# Patient Record
Sex: FEMALE | Race: WHITE | NOT HISPANIC OR LATINO | Employment: PART TIME | URBAN - NONMETROPOLITAN AREA
[De-identification: names, ages, dates, MRNs, and addresses within clinical notes are randomized per-mention and may not be internally consistent; named-entity substitution may affect disease eponyms.]

---
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
## 2023-11-20 ENCOUNTER — HOSPITAL ENCOUNTER (OUTPATIENT)
Dept: RADIOLOGY | Facility: HOSPITAL | Age: 18
Discharge: HOME | End: 2023-11-20
Attending: NURSE PRACTITIONER
Payer: COMMERCIAL

## 2023-11-20 DIAGNOSIS — O26.842 SIZE OF FETUS INCONSISTENT WITH DATES IN SECOND TRIMESTER: ICD-10-CM

## 2023-11-20 PROCEDURE — 76805 OB US >/= 14 WKS SNGL FETUS: CPT | Mod: 26,,, | Performed by: RADIOLOGY

## 2023-11-20 PROCEDURE — 76805 OB US >/= 14 WKS SNGL FETUS: CPT | Mod: TC

## 2023-11-20 PROCEDURE — 76805 US OB 14+ WEEKS, TRANSABDOM, SINGLE GESTATION: ICD-10-PCS | Mod: 26,,, | Performed by: RADIOLOGY

## 2024-03-21 ENCOUNTER — HOSPITAL ENCOUNTER (OUTPATIENT)
Dept: RADIOLOGY | Facility: HOSPITAL | Age: 19
Discharge: HOME | End: 2024-03-21
Attending: NURSE PRACTITIONER
Payer: COMMERCIAL

## 2024-03-21 PROCEDURE — 76805 OB US >/= 14 WKS SNGL FETUS: CPT | Mod: 26,,, | Performed by: RADIOLOGY

## 2024-03-21 PROCEDURE — 76820 UMBILICAL ARTERY ECHO: CPT | Mod: 26,,, | Performed by: RADIOLOGY

## 2024-03-21 PROCEDURE — 76820 UMBILICAL ARTERY ECHO: CPT | Mod: TC

## 2024-03-21 PROCEDURE — 76805 OB US >/= 14 WKS SNGL FETUS: CPT | Mod: TC

## 2024-04-15 ENCOUNTER — ANESTHESIA EVENT (OUTPATIENT)
Dept: OBSTETRICS AND GYNECOLOGY | Facility: HOSPITAL | Age: 19
End: 2024-04-15
Payer: COMMERCIAL

## 2024-04-15 ENCOUNTER — HOSPITAL ENCOUNTER (INPATIENT)
Facility: HOSPITAL | Age: 19
LOS: 2 days | Discharge: HOME | End: 2024-04-17
Attending: OBSTETRICS & GYNECOLOGY | Admitting: OBSTETRICS & GYNECOLOGY
Payer: COMMERCIAL

## 2024-04-15 ENCOUNTER — ANESTHESIA (OUTPATIENT)
Dept: OBSTETRICS AND GYNECOLOGY | Facility: HOSPITAL | Age: 19
End: 2024-04-15
Payer: COMMERCIAL

## 2024-04-15 DIAGNOSIS — Z67.91 RH NEGATIVE STATUS DURING PREGNANCY IN THIRD TRIMESTER: ICD-10-CM

## 2024-04-15 DIAGNOSIS — O26.893 RH NEGATIVE STATUS DURING PREGNANCY IN THIRD TRIMESTER: ICD-10-CM

## 2024-04-15 DIAGNOSIS — Z34.90 PREGNANCY: Primary | ICD-10-CM

## 2024-04-15 DIAGNOSIS — Z34.93 PREGNANT AND NOT YET DELIVERED IN THIRD TRIMESTER: ICD-10-CM

## 2024-04-15 DIAGNOSIS — Z91.89 VAGINAL DELIVERY WITH POTENTIAL FOR HEMORRHAGE: ICD-10-CM

## 2024-04-15 LAB
ALBUMIN SERPL BCP-MCNC: 2.4 G/DL (ref 3.5–5)
ALBUMIN/GLOB SERPL: 0.7 {RATIO}
ALP SERPL-CCNC: 190 U/L (ref 52–144)
ALT SERPL W P-5'-P-CCNC: 12 U/L (ref 13–56)
ANION GAP SERPL CALCULATED.3IONS-SCNC: 13 MMOL/L (ref 7–16)
AST SERPL W P-5'-P-CCNC: 7 U/L (ref 15–37)
BASOPHILS # BLD AUTO: 0.04 K/UL (ref 0–0.2)
BASOPHILS NFR BLD AUTO: 0.3 % (ref 0–1)
BILIRUB SERPL-MCNC: 0.4 MG/DL (ref ?–1.2)
BILIRUB UR QL STRIP: NEGATIVE
BUN SERPL-MCNC: 8 MG/DL (ref 7–18)
BUN/CREAT SERPL: 17 (ref 6–20)
CALCIUM SERPL-MCNC: 9 MG/DL (ref 8.5–10.1)
CHLORIDE SERPL-SCNC: 108 MMOL/L (ref 98–107)
CLARITY UR: CLEAR
CO2 SERPL-SCNC: 21 MMOL/L (ref 21–32)
COLOR UR: YELLOW
CREAT SERPL-MCNC: 0.47 MG/DL (ref 0.55–1.02)
DIFFERENTIAL METHOD BLD: ABNORMAL
EGFR (NO RACE VARIABLE) (RUSH/TITUS): 140 ML/MIN/1.73M2
EOSINOPHIL # BLD AUTO: 0.11 K/UL (ref 0–0.5)
EOSINOPHIL NFR BLD AUTO: 0.8 % (ref 1–4)
ERYTHROCYTE [DISTWIDTH] IN BLOOD BY AUTOMATED COUNT: 13.9 % (ref 11.5–14.5)
GLOBULIN SER-MCNC: 3.5 G/DL (ref 2–4)
GLUCOSE SERPL-MCNC: 106 MG/DL (ref 74–106)
GLUCOSE UR STRIP-MCNC: NORMAL MG/DL
HBV SURFACE AG SERPL QL IA: NORMAL
HCT VFR BLD AUTO: 32.2 % (ref 38–47)
HGB BLD-MCNC: 10.4 G/DL (ref 12–16)
HIV 1+O+2 AB SERPL QL: NORMAL
HYALINE CASTS #/AREA URNS LPF: ABNORMAL /LPF
IMM GRANULOCYTES # BLD AUTO: 0.11 K/UL (ref 0–0.04)
IMM GRANULOCYTES NFR BLD: 0.8 % (ref 0–0.4)
INDIRECT COOMBS: NORMAL
KETONES UR STRIP-SCNC: NEGATIVE MG/DL
LEUKOCYTE ESTERASE UR QL STRIP: ABNORMAL
LYMPHOCYTES # BLD AUTO: 2.53 K/UL (ref 1–4.8)
LYMPHOCYTES NFR BLD AUTO: 19 % (ref 27–41)
MCH RBC QN AUTO: 28.3 PG (ref 27–31)
MCHC RBC AUTO-ENTMCNC: 32.3 G/DL (ref 32–36)
MCV RBC AUTO: 87.5 FL (ref 80–96)
MONOCYTES # BLD AUTO: 0.96 K/UL (ref 0–0.8)
MONOCYTES NFR BLD AUTO: 7.2 % (ref 2–6)
MPC BLD CALC-MCNC: 11.4 FL (ref 9.4–12.4)
MUCOUS, UA: ABNORMAL /LPF
NEUTROPHILS # BLD AUTO: 9.57 K/UL (ref 1.8–7.7)
NEUTROPHILS NFR BLD AUTO: 71.9 % (ref 53–65)
NITRITE UR QL STRIP: NEGATIVE
NRBC # BLD AUTO: 0 X10E3/UL
NRBC, AUTO (.00): 0 %
PH UR STRIP: 7 PH UNITS
PLATELET # BLD AUTO: 294 K/UL (ref 150–400)
POTASSIUM SERPL-SCNC: 3.8 MMOL/L (ref 3.5–5.1)
PROT SERPL-MCNC: 5.9 G/DL (ref 6.4–8.2)
PROT UR QL STRIP: 20
RBC # BLD AUTO: 3.68 M/UL (ref 4.2–5.4)
RBC # UR STRIP: NEGATIVE /UL
RBC #/AREA URNS HPF: 2 /HPF
RH BLD: NORMAL
RUBV IGG SER-ACNC: NORMAL [IU]/ML
SODIUM SERPL-SCNC: 138 MMOL/L (ref 136–145)
SP GR UR STRIP: 1.03
SPECIMEN OUTDATE: NORMAL
SQUAMOUS #/AREA URNS LPF: ABNORMAL /HPF
SYPHILIS AB INTERPRETATION: NORMAL
UROBILINOGEN UR STRIP-ACNC: 2 MG/DL
WBC # BLD AUTO: 13.32 K/UL (ref 4.5–11)
WBC #/AREA URNS HPF: 3 /HPF

## 2024-04-15 PROCEDURE — 27000980 HC MATTRESS, OVERLAY WAFFLE

## 2024-04-15 PROCEDURE — 72200005 HC VAGINAL DELIVERY LEVEL II

## 2024-04-15 PROCEDURE — 86762 RUBELLA ANTIBODY: CPT | Performed by: OBSTETRICS & GYNECOLOGY

## 2024-04-15 PROCEDURE — 11000001 HC ACUTE MED/SURG PRIVATE ROOM

## 2024-04-15 PROCEDURE — 63600175 PHARM REV CODE 636 W HCPCS: Performed by: OBSTETRICS & GYNECOLOGY

## 2024-04-15 PROCEDURE — 87389 HIV-1 AG W/HIV-1&-2 AB AG IA: CPT | Performed by: OBSTETRICS & GYNECOLOGY

## 2024-04-15 PROCEDURE — 63600175 PHARM REV CODE 636 W HCPCS: Performed by: ANESTHESIOLOGY

## 2024-04-15 PROCEDURE — 25000003 PHARM REV CODE 250: Performed by: NURSE PRACTITIONER

## 2024-04-15 PROCEDURE — 27200960 HC CATHETER, FOLEY (ANY)

## 2024-04-15 PROCEDURE — 81001 URINALYSIS AUTO W/SCOPE: CPT | Performed by: OBSTETRICS & GYNECOLOGY

## 2024-04-15 PROCEDURE — 51702 INSERT TEMP BLADDER CATH: CPT

## 2024-04-15 PROCEDURE — 62326 NJX INTERLAMINAR LMBR/SAC: CPT | Performed by: ANESTHESIOLOGY

## 2024-04-15 PROCEDURE — 3E033VJ INTRODUCTION OF OTHER HORMONE INTO PERIPHERAL VEIN, PERCUTANEOUS APPROACH: ICD-10-PCS | Performed by: NURSE PRACTITIONER

## 2024-04-15 PROCEDURE — 86901 BLOOD TYPING SEROLOGIC RH(D): CPT | Performed by: OBSTETRICS & GYNECOLOGY

## 2024-04-15 PROCEDURE — 0KQM0ZZ REPAIR PERINEUM MUSCLE, OPEN APPROACH: ICD-10-PCS | Performed by: NURSE PRACTITIONER

## 2024-04-15 PROCEDURE — 72100002 HC LABOR CARE, 1ST 8 HOURS

## 2024-04-15 PROCEDURE — 59409 OBSTETRICAL CARE: CPT | Mod: AA,P2,, | Performed by: ANESTHESIOLOGY

## 2024-04-15 PROCEDURE — 25000003 PHARM REV CODE 250: Performed by: OBSTETRICS & GYNECOLOGY

## 2024-04-15 PROCEDURE — 85025 COMPLETE CBC W/AUTO DIFF WBC: CPT | Performed by: OBSTETRICS & GYNECOLOGY

## 2024-04-15 PROCEDURE — 25000003 PHARM REV CODE 250: Performed by: ANESTHESIOLOGY

## 2024-04-15 PROCEDURE — 87340 HEPATITIS B SURFACE AG IA: CPT | Performed by: OBSTETRICS & GYNECOLOGY

## 2024-04-15 PROCEDURE — 10907ZC DRAINAGE OF AMNIOTIC FLUID, THERAPEUTIC FROM PRODUCTS OF CONCEPTION, VIA NATURAL OR ARTIFICIAL OPENING: ICD-10-PCS | Performed by: NURSE PRACTITIONER

## 2024-04-15 PROCEDURE — 72100003 HC LABOR CARE, EA. ADDL. 8 HRS

## 2024-04-15 PROCEDURE — 80053 COMPREHEN METABOLIC PANEL: CPT | Performed by: OBSTETRICS & GYNECOLOGY

## 2024-04-15 PROCEDURE — 86780 TREPONEMA PALLIDUM: CPT | Performed by: OBSTETRICS & GYNECOLOGY

## 2024-04-15 RX ORDER — DIPHENHYDRAMINE HCL 25 MG
25 CAPSULE ORAL EVERY 4 HOURS PRN
Status: DISCONTINUED | OUTPATIENT
Start: 2024-04-15 | End: 2024-04-17 | Stop reason: HOSPADM

## 2024-04-15 RX ORDER — ONDANSETRON 4 MG/1
8 TABLET, ORALLY DISINTEGRATING ORAL EVERY 8 HOURS PRN
Status: DISCONTINUED | OUTPATIENT
Start: 2024-04-15 | End: 2024-04-17 | Stop reason: HOSPADM

## 2024-04-15 RX ORDER — OXYTOCIN/RINGER'S LACTATE 30/500 ML
95 PLASTIC BAG, INJECTION (ML) INTRAVENOUS ONCE
Status: DISCONTINUED | OUTPATIENT
Start: 2024-04-15 | End: 2024-04-17 | Stop reason: HOSPADM

## 2024-04-15 RX ORDER — BUTORPHANOL TARTRATE 2 MG/ML
1 INJECTION INTRAMUSCULAR; INTRAVENOUS
Status: DISCONTINUED | OUTPATIENT
Start: 2024-04-15 | End: 2024-04-15 | Stop reason: SDUPTHER

## 2024-04-15 RX ORDER — SODIUM CHLORIDE, SODIUM LACTATE, POTASSIUM CHLORIDE, CALCIUM CHLORIDE 600; 310; 30; 20 MG/100ML; MG/100ML; MG/100ML; MG/100ML
INJECTION, SOLUTION INTRAVENOUS CONTINUOUS
Status: DISCONTINUED | OUTPATIENT
Start: 2024-04-15 | End: 2024-04-15

## 2024-04-15 RX ORDER — OXYTOCIN/RINGER'S LACTATE 30/500 ML
334 PLASTIC BAG, INJECTION (ML) INTRAVENOUS ONCE AS NEEDED
Status: DISCONTINUED | OUTPATIENT
Start: 2024-04-15 | End: 2024-04-15 | Stop reason: SDUPTHER

## 2024-04-15 RX ORDER — ONDANSETRON HYDROCHLORIDE 2 MG/ML
4 INJECTION, SOLUTION INTRAVENOUS EVERY 6 HOURS PRN
Status: DISCONTINUED | OUTPATIENT
Start: 2024-04-15 | End: 2024-04-17 | Stop reason: HOSPADM

## 2024-04-15 RX ORDER — SIMETHICONE 80 MG
1 TABLET,CHEWABLE ORAL 4 TIMES DAILY PRN
Status: DISCONTINUED | OUTPATIENT
Start: 2024-04-15 | End: 2024-04-15 | Stop reason: SDUPTHER

## 2024-04-15 RX ORDER — OXYTOCIN/RINGER'S LACTATE 30/500 ML
95 PLASTIC BAG, INJECTION (ML) INTRAVENOUS ONCE AS NEEDED
Status: DISCONTINUED | OUTPATIENT
Start: 2024-04-15 | End: 2024-04-17 | Stop reason: HOSPADM

## 2024-04-15 RX ORDER — DIPHENHYDRAMINE HYDROCHLORIDE 50 MG/ML
25 INJECTION INTRAMUSCULAR; INTRAVENOUS EVERY 4 HOURS PRN
Status: DISCONTINUED | OUTPATIENT
Start: 2024-04-15 | End: 2024-04-17 | Stop reason: HOSPADM

## 2024-04-15 RX ORDER — OXYTOCIN 10 [USP'U]/ML
10 INJECTION, SOLUTION INTRAMUSCULAR; INTRAVENOUS ONCE AS NEEDED
Status: DISCONTINUED | OUTPATIENT
Start: 2024-04-15 | End: 2024-04-17 | Stop reason: HOSPADM

## 2024-04-15 RX ORDER — FENTANYL/ROPIVACAINE/NS/PF 2MCG/ML-.2
PLASTIC BAG, INJECTION (ML) INJECTION CONTINUOUS
Status: DISCONTINUED | OUTPATIENT
Start: 2024-04-15 | End: 2024-04-15

## 2024-04-15 RX ORDER — TRANEXAMIC ACID 10 MG/ML
1000 INJECTION, SOLUTION INTRAVENOUS EVERY 30 MIN PRN
Status: DISCONTINUED | OUTPATIENT
Start: 2024-04-15 | End: 2024-04-15 | Stop reason: SDUPTHER

## 2024-04-15 RX ORDER — SODIUM CHLORIDE 0.9 % (FLUSH) 0.9 %
10 SYRINGE (ML) INJECTION 2 TIMES DAILY
Status: DISCONTINUED | OUTPATIENT
Start: 2024-04-15 | End: 2024-04-17 | Stop reason: HOSPADM

## 2024-04-15 RX ORDER — OXYTOCIN/RINGER'S LACTATE 30/500 ML
95 PLASTIC BAG, INJECTION (ML) INTRAVENOUS ONCE AS NEEDED
Status: DISCONTINUED | OUTPATIENT
Start: 2024-04-15 | End: 2024-04-15 | Stop reason: SDUPTHER

## 2024-04-15 RX ORDER — CALCIUM CARBONATE 200(500)MG
500 TABLET,CHEWABLE ORAL 3 TIMES DAILY PRN
Status: DISCONTINUED | OUTPATIENT
Start: 2024-04-15 | End: 2024-04-17 | Stop reason: HOSPADM

## 2024-04-15 RX ORDER — ROPIVACAINE HYDROCHLORIDE 7.5 MG/ML
INJECTION, SOLUTION EPIDURAL; PERINEURAL
Status: COMPLETED | OUTPATIENT
Start: 2024-04-15 | End: 2024-04-15

## 2024-04-15 RX ORDER — OXYTOCIN/RINGER'S LACTATE 30/500 ML
0-32 PLASTIC BAG, INJECTION (ML) INTRAVENOUS CONTINUOUS
Status: DISCONTINUED | OUTPATIENT
Start: 2024-04-15 | End: 2024-04-15 | Stop reason: SDUPTHER

## 2024-04-15 RX ORDER — METHYLERGONOVINE MALEATE 0.2 MG/ML
200 INJECTION INTRAVENOUS ONCE AS NEEDED
Status: DISCONTINUED | OUTPATIENT
Start: 2024-04-15 | End: 2024-04-15 | Stop reason: SDUPTHER

## 2024-04-15 RX ORDER — BUTORPHANOL TARTRATE 2 MG/ML
2 INJECTION INTRAMUSCULAR; INTRAVENOUS
Status: DISCONTINUED | OUTPATIENT
Start: 2024-04-15 | End: 2024-04-15

## 2024-04-15 RX ORDER — METHYLERGONOVINE MALEATE 0.2 MG/ML
200 INJECTION INTRAVENOUS ONCE AS NEEDED
Status: DISCONTINUED | OUTPATIENT
Start: 2024-04-15 | End: 2024-04-17 | Stop reason: HOSPADM

## 2024-04-15 RX ORDER — ONDANSETRON 4 MG/1
8 TABLET, ORALLY DISINTEGRATING ORAL EVERY 8 HOURS PRN
Status: DISCONTINUED | OUTPATIENT
Start: 2024-04-15 | End: 2024-04-15 | Stop reason: SDUPTHER

## 2024-04-15 RX ORDER — EPHEDRINE SULFATE 50 MG/ML
10 INJECTION, SOLUTION INTRAVENOUS ONCE AS NEEDED
Status: DISCONTINUED | OUTPATIENT
Start: 2024-04-15 | End: 2024-04-17 | Stop reason: HOSPADM

## 2024-04-15 RX ORDER — OXYTOCIN 10 [USP'U]/ML
10 INJECTION, SOLUTION INTRAMUSCULAR; INTRAVENOUS ONCE AS NEEDED
Status: DISCONTINUED | OUTPATIENT
Start: 2024-04-15 | End: 2024-04-15 | Stop reason: SDUPTHER

## 2024-04-15 RX ORDER — FAMOTIDINE 10 MG/ML
20 INJECTION INTRAVENOUS ONCE AS NEEDED
Status: DISCONTINUED | OUTPATIENT
Start: 2024-04-15 | End: 2024-04-17 | Stop reason: HOSPADM

## 2024-04-15 RX ORDER — HYDROCORTISONE 25 MG/G
CREAM TOPICAL 3 TIMES DAILY PRN
Status: DISCONTINUED | OUTPATIENT
Start: 2024-04-15 | End: 2024-04-17 | Stop reason: HOSPADM

## 2024-04-15 RX ORDER — IBUPROFEN 800 MG/1
800 TABLET ORAL EVERY 8 HOURS PRN
Status: DISCONTINUED | OUTPATIENT
Start: 2024-04-15 | End: 2024-04-17 | Stop reason: HOSPADM

## 2024-04-15 RX ORDER — SODIUM CITRATE AND CITRIC ACID MONOHYDRATE 334; 500 MG/5ML; MG/5ML
30 SOLUTION ORAL ONCE AS NEEDED
Status: DISCONTINUED | OUTPATIENT
Start: 2024-04-15 | End: 2024-04-17 | Stop reason: HOSPADM

## 2024-04-15 RX ORDER — HYDROCODONE BITARTRATE AND ACETAMINOPHEN 5; 325 MG/1; MG/1
1 TABLET ORAL EVERY 4 HOURS PRN
Status: DISCONTINUED | OUTPATIENT
Start: 2024-04-15 | End: 2024-04-17 | Stop reason: HOSPADM

## 2024-04-15 RX ORDER — CARBOPROST TROMETHAMINE 250 UG/ML
250 INJECTION, SOLUTION INTRAMUSCULAR
Status: DISCONTINUED | OUTPATIENT
Start: 2024-04-15 | End: 2024-04-17 | Stop reason: HOSPADM

## 2024-04-15 RX ORDER — SIMETHICONE 80 MG
1 TABLET,CHEWABLE ORAL EVERY 6 HOURS PRN
Status: DISCONTINUED | OUTPATIENT
Start: 2024-04-15 | End: 2024-04-17 | Stop reason: HOSPADM

## 2024-04-15 RX ORDER — CARBOPROST TROMETHAMINE 250 UG/ML
250 INJECTION, SOLUTION INTRAMUSCULAR
Status: DISCONTINUED | OUTPATIENT
Start: 2024-04-15 | End: 2024-04-15 | Stop reason: SDUPTHER

## 2024-04-15 RX ORDER — DOCUSATE SODIUM 100 MG/1
200 CAPSULE, LIQUID FILLED ORAL 2 TIMES DAILY PRN
Status: DISCONTINUED | OUTPATIENT
Start: 2024-04-15 | End: 2024-04-17 | Stop reason: HOSPADM

## 2024-04-15 RX ORDER — PRENATAL WITH FERROUS FUM AND FOLIC ACID 3080; 920; 120; 400; 22; 1.84; 3; 20; 10; 1; 12; 200; 27; 25; 2 [IU]/1; [IU]/1; MG/1; [IU]/1; MG/1; MG/1; MG/1; MG/1; MG/1; MG/1; UG/1; MG/1; MG/1; MG/1; MG/1
1 TABLET ORAL DAILY
Status: DISCONTINUED | OUTPATIENT
Start: 2024-04-16 | End: 2024-04-17 | Stop reason: HOSPADM

## 2024-04-15 RX ORDER — MISOPROSTOL 200 UG/1
800 TABLET ORAL ONCE AS NEEDED
Status: DISCONTINUED | OUTPATIENT
Start: 2024-04-15 | End: 2024-04-17 | Stop reason: HOSPADM

## 2024-04-15 RX ORDER — SODIUM CHLORIDE 9 MG/ML
INJECTION, SOLUTION INTRAVENOUS
Status: DISCONTINUED | OUTPATIENT
Start: 2024-04-15 | End: 2024-04-15

## 2024-04-15 RX ORDER — TRANEXAMIC ACID 10 MG/ML
1000 INJECTION, SOLUTION INTRAVENOUS EVERY 30 MIN PRN
Status: DISCONTINUED | OUTPATIENT
Start: 2024-04-15 | End: 2024-04-17 | Stop reason: HOSPADM

## 2024-04-15 RX ORDER — DIPHENOXYLATE HYDROCHLORIDE AND ATROPINE SULFATE 2.5; .025 MG/1; MG/1
2 TABLET ORAL EVERY 6 HOURS PRN
Status: DISCONTINUED | OUTPATIENT
Start: 2024-04-15 | End: 2024-04-15 | Stop reason: SDUPTHER

## 2024-04-15 RX ORDER — MISOPROSTOL 200 UG/1
800 TABLET ORAL ONCE AS NEEDED
Status: DISCONTINUED | OUTPATIENT
Start: 2024-04-15 | End: 2024-04-15 | Stop reason: SDUPTHER

## 2024-04-15 RX ORDER — LIDOCAINE HYDROCHLORIDE 10 MG/ML
10 INJECTION INFILTRATION; PERINEURAL ONCE AS NEEDED
Status: DISCONTINUED | OUTPATIENT
Start: 2024-04-15 | End: 2024-04-17 | Stop reason: HOSPADM

## 2024-04-15 RX ORDER — DIPHENOXYLATE HYDROCHLORIDE AND ATROPINE SULFATE 2.5; .025 MG/1; MG/1
2 TABLET ORAL EVERY 6 HOURS PRN
Status: DISCONTINUED | OUTPATIENT
Start: 2024-04-15 | End: 2024-04-17 | Stop reason: HOSPADM

## 2024-04-15 RX ADMIN — AMPICILLIN SODIUM 2 G: 2 INJECTION, POWDER, FOR SOLUTION INTRAMUSCULAR; INTRAVENOUS at 05:04

## 2024-04-15 RX ADMIN — SODIUM CHLORIDE, POTASSIUM CHLORIDE, SODIUM LACTATE AND CALCIUM CHLORIDE: 600; 310; 30; 20 INJECTION, SOLUTION INTRAVENOUS at 09:04

## 2024-04-15 RX ADMIN — IBUPROFEN 800 MG: 800 TABLET, FILM COATED ORAL at 07:04

## 2024-04-15 RX ADMIN — AMPICILLIN SODIUM 1 G: 1 INJECTION, POWDER, FOR SOLUTION INTRAMUSCULAR; INTRAVENOUS at 09:04

## 2024-04-15 RX ADMIN — SODIUM CHLORIDE, POTASSIUM CHLORIDE, SODIUM LACTATE AND CALCIUM CHLORIDE: 600; 310; 30; 20 INJECTION, SOLUTION INTRAVENOUS at 05:04

## 2024-04-15 RX ADMIN — ROPIVACAINE HYDROCHLORIDE 10 ML/HR: 10 INJECTION, SOLUTION EPIDURAL at 09:04

## 2024-04-15 RX ADMIN — ROPIVACAINE HYDROCHLORIDE 5 ML: 7.5 INJECTION, SOLUTION EPIDURAL; PERINEURAL at 09:04

## 2024-04-15 RX ADMIN — OXYTOCIN 334 MILLI-UNITS/MIN: 10 INJECTION, SOLUTION INTRAMUSCULAR; INTRAVENOUS at 05:04

## 2024-04-15 RX ADMIN — ROPIVACAINE HYDROCHLORIDE 500 MCG: 10 INJECTION, SOLUTION EPIDURAL at 09:04

## 2024-04-15 RX ADMIN — OXYTOCIN 2 MILLI-UNITS/MIN: 10 INJECTION, SOLUTION INTRAMUSCULAR; INTRAVENOUS at 05:04

## 2024-04-15 NOTE — PLAN OF CARE
Problem: Adult Inpatient Plan of Care  Goal: Plan of Care Review  Outcome: Ongoing, Progressing  Goal: Patient-Specific Goal (Individualized)  Outcome: Ongoing, Progressing  Goal: Absence of Hospital-Acquired Illness or Injury  Outcome: Ongoing, Progressing  Goal: Optimal Comfort and Wellbeing  Outcome: Ongoing, Progressing  Goal: Readiness for Transition of Care  Outcome: Ongoing, Progressing     Problem: Infection  Goal: Absence of Infection Signs and Symptoms  Outcome: Ongoing, Progressing     Problem:  Fall Injury Risk  Goal: Absence of Fall, Infant Drop and Related Injury  Outcome: Ongoing, Progressing     Problem: Bariatric Environmental Safety  Goal: Safety Maintained with Care  Outcome: Ongoing, Progressing     Problem: Bleeding (Labor)  Goal: Hemostasis  Outcome: Ongoing, Progressing     Problem: Change in Fetal Wellbeing (Labor)  Goal: Stable Fetal Wellbeing  Outcome: Ongoing, Progressing     Problem: Delayed Labor Progression (Labor)  Goal: Effective Progression to Delivery  Outcome: Ongoing, Progressing     Problem: Infection (Labor)  Goal: Absence of Infection Signs and Symptoms  Outcome: Ongoing, Progressing     Problem: Labor Pain (Labor)  Goal: Acceptable Pain Control  Outcome: Ongoing, Progressing     Problem: Uterine Tachysystole (Labor)  Goal: Normal Uterine Contraction Pattern  Outcome: Ongoing, Progressing

## 2024-04-15 NOTE — PLAN OF CARE
Problem: Adult Inpatient Plan of Care  Goal: Plan of Care Review  4/15/2024 1835 by Beth De La O RN  Outcome: Ongoing, Progressing  4/15/2024 0734 by Beth De La O RN  Outcome: Ongoing, Progressing  Goal: Patient-Specific Goal (Individualized)  4/15/2024 1835 by eBth De La O RN  Outcome: Ongoing, Progressing  4/15/2024 0734 by Beth De La O RN  Outcome: Ongoing, Progressing  Goal: Absence of Hospital-Acquired Illness or Injury  4/15/2024 1835 by Beth De La O RN  Outcome: Ongoing, Progressing  4/15/2024 0734 by Beth De La O RN  Outcome: Ongoing, Progressing  Goal: Optimal Comfort and Wellbeing  4/15/2024 1835 by Beth De La O RN  Outcome: Ongoing, Progressing  4/15/2024 0734 by Beth De La O RN  Outcome: Ongoing, Progressing  Goal: Readiness for Transition of Care  4/15/2024 1835 by Beth De La O RN  Outcome: Ongoing, Progressing  4/15/2024 0734 by Beth De La O RN  Outcome: Ongoing, Progressing     Problem: Infection  Goal: Absence of Infection Signs and Symptoms  4/15/2024 1835 by Beth De La O RN  Outcome: Ongoing, Progressing  4/15/2024 0734 by Beth De La O RN  Outcome: Ongoing, Progressing     Problem:  Fall Injury Risk  Goal: Absence of Fall, Infant Drop and Related Injury  4/15/2024 1835 by Beth De La O RN  Outcome: Ongoing, Progressing  4/15/2024 0734 by Beth De La O RN  Outcome: Ongoing, Progressing     Problem: Bariatric Environmental Safety  Goal: Safety Maintained with Care  4/15/2024 1835 by Beth De La O RN  Outcome: Ongoing, Progressing  4/15/2024 0734 by Beth De La O RN  Outcome: Ongoing, Progressing     Problem: Adjustment to Role Transition (Postpartum Vaginal Delivery)  Goal: Successful Maternal Role Transition  Outcome: Ongoing, Progressing     Problem: Bleeding (Postpartum Vaginal Delivery)  Goal: Hemostasis  Outcome: Ongoing, Progressing     Problem: Infection (Postpartum Vaginal Delivery)  Goal: Absence of  Infection Signs/Symptoms  Outcome: Ongoing, Progressing     Problem: Pain (Postpartum Vaginal Delivery)  Goal: Acceptable Pain Control  Outcome: Ongoing, Progressing     Problem: Urinary Retention (Postpartum Vaginal Delivery)  Goal: Effective Urinary Elimination  Outcome: Ongoing, Progressing     Problem: Breastfeeding  Goal: Effective Breastfeeding  Outcome: Ongoing, Progressing     Problem: Bleeding (Labor)  Goal: Hemostasis  4/15/2024 1835 by Beth De La O RN  Outcome: Met  4/15/2024 0734 by Beth De La O RN  Outcome: Ongoing, Progressing     Problem: Change in Fetal Wellbeing (Labor)  Goal: Stable Fetal Wellbeing  4/15/2024 1835 by Beth De La O RN  Outcome: Met  4/15/2024 0734 by Beth De La O RN  Outcome: Ongoing, Progressing     Problem: Delayed Labor Progression (Labor)  Goal: Effective Progression to Delivery  4/15/2024 1835 by Beth De La O RN  Outcome: Met  4/15/2024 0734 by Beth De La O RN  Outcome: Ongoing, Progressing     Problem: Infection (Labor)  Goal: Absence of Infection Signs and Symptoms  4/15/2024 1835 by Beth De La O RN  Outcome: Met  4/15/2024 0734 by Beth De La O RN  Outcome: Ongoing, Progressing     Problem: Labor Pain (Labor)  Goal: Acceptable Pain Control  4/15/2024 1835 by Beth De La O RN  Outcome: Met  4/15/2024 0734 by Beth De La O RN  Outcome: Ongoing, Progressing     Problem: Uterine Tachysystole (Labor)  Goal: Normal Uterine Contraction Pattern  4/15/2024 1835 by Beth De La O RN  Outcome: Met  4/15/2024 0734 by Beth De La O RN  Outcome: Ongoing, Progressing

## 2024-04-15 NOTE — ANESTHESIA PREPROCEDURE EVALUATION
04/15/2024  Cathleen Howell is a 18 y.o., female.      Pre-op Assessment    I have reviewed the Patient Summary Reports.     I have reviewed the Nursing Notes. I have reviewed the NPO Status.   I have reviewed the Medications.     Review of Systems  Anesthesia Hx:  No problems with previous Anesthesia                Social:  Non-Smoker, No Alcohol Use       Hematology/Oncology:  Hematology Normal   Oncology Normal                                   EENT/Dental:  EENT/Dental Normal           Cardiovascular:  Cardiovascular Normal                                            Pulmonary:  Pulmonary Normal                       Renal/:  Renal/ Normal                 Hepatic/GI:  Hepatic/GI Normal                 Musculoskeletal:  Musculoskeletal Normal                OB/GYN/PEDS:  Iup, term, labor           Neurological:  Neurology Normal                                      Endocrine:  Endocrine Normal            Dermatological:  Skin Normal    Psych:   anxiety                 Physical Exam  General: Well nourished    Airway:  Mallampati: II / II  Mouth Opening: Normal  TM Distance: > 6 cm  Tongue: Normal  Neck ROM: Normal ROM    Chest/Lungs:  Clear to auscultation, Normal Respiratory Rate    Heart:  Rate: Normal  Rhythm: Regular Rhythm        Anesthesia Plan  Type of Anesthesia, risks & benefits discussed:    Anesthesia Type: Epidural  Intra-op Monitoring Plan: Standard ASA Monitors  Post Op Pain Control Plan: epidural analgesia  Induction:  IV  Informed Consent: Informed consent signed with the Patient and all parties understand the risks and agree with anesthesia plan.  All questions answered.   ASA Score: 2  Day of Surgery Review of History & Physical: H&P Update referred to the surgeon/provider.I have interviewed and examined the patient. I have reviewed the patient's H&P dated: There are no significant  Cont home meds      changes.     Ready For Surgery From Anesthesia Perspective.     .

## 2024-04-15 NOTE — ANESTHESIA PROCEDURE NOTES
Epidural    Patient location during procedure: OB   Reason for block: primary anesthetic   Reason for block: labor analgesia requested by patient and obstetrician  Diagnosis: iup, term, labor   Start time: 4/15/2024 9:48 AM  Timeout: 4/15/2024 9:48 AM    Staffing  Performing Provider: Ramiro Torres MD  Authorizing Provider: Ramiro Torres MD    Staffing  Performed by: Ramiro Torres MD  Authorized by: Ramiro Torres MD        Preanesthetic Checklist  Completed: patient identified, IV checked, site marked, risks and benefits discussed, surgical consent, monitors and equipment checked, pre-op evaluation, timeout performed, anesthesia consent given, hand hygiene performed and patient being monitored  Preparation  Patient position: sitting  Prep: Betadine  Patient monitoring: ECG, Pulse Ox, continuous capnometry and Blood Pressure  Reason for block: primary anesthetic   Epidural  Skin Anesthetic: lidocaine 1%  Administration type: continuous  Approach: midline  Interspace: L3-4    Injection technique: MARIPOSA saline  Needle and Epidural Catheter  Needle type: Tuohy   Needle gauge: 18  Needle length: 3.5 inches  Catheter type: end hole  Insertion Attempts: 1  Test dose: 3 mL of lidocaine 1.5% with Epi 1-to-200,000  Additional Documentation: incremental injection  Needle localization: anatomical landmarks  Assessment  Ease of block: easy  Patient's tolerance of the procedure: comfortable throughout block No inadvertent dural puncture with Tuohy.  Dural puncture not performed with spinal needle    Medications:    Medications: ROPIvacaine (NAROPIN) injection 0.75% - Epidural   5 mL - 4/15/2024 9:48:00 AM

## 2024-04-15 NOTE — PROCEDURES
"[facility] -  Labor and Delivery  Vaginal Delivery   Operative Note    SUMMARY     Normal spontaneous vaginal delivery of live infant, was placed on mothers abdomen for skin to skin and bulb suctioning performed.  Infant delivered position OA over perineum.  Nuchal cord: Yes, cord reduced at perineum.    Spontaneous delivery of placenta and IV pitocin given noting good uterine tone.  2nd degree laceration noted and repaired in normal fashion with 2-0 chromic .  Patient tolerated delivery well. Sponge needle and lap counted correctly x2.    Indications: Post term pregnancy over 40 weeks  Pregnancy complicated by:   Patient Active Problem List   Diagnosis    Post term pregnancy over 40 weeks     Admitting GA: 40w5d    Delivery Information for Felicita Howell    Birth information:  YOB: 2026   Time of birth: 5:11 PM   Sex: female   Head Delivery Date/Time: 4/15/2024  5:11 PM   Delivery type: Vaginal, Spontaneous   Gestational Age: 40w5d        Delivery Providers    Delivering clinician: Nori Bullard FNP           Measurements    Weight: 3232 g  Weight (lbs): 7 lb 2 oz  Length: 53.3 cm  Length (in): 21"         Apgars    Living status: Living  Apgar Component Scores:  1 min.:  5 min.:  10 min.:  15 min.:  20 min.:    Skin color:  0  1       Heart rate:  2  2       Reflex irritability:  2  2       Muscle tone:  2  2       Respiratory effort:  2  2       Total:  8  9       Apgars assigned by: MATTHEW RAMIREZ RN         Operative Delivery    Forceps attempted?: No  Vacuum extractor attempted?: No         Shoulder Dystocia    Shoulder dystocia present?: No           Presentation    Presentation: Vertex  Position: Left Occiput Anterior           Interventions/Resuscitation    Method: Bulb Suctioning, Tactile Stimulation       Cord    Vessels: 3 vessels  Complications: Nuchal  Nuchal Intervention: reduced  Nuchal Cord Description: loose nuchal cord  Number of Loops: 1  Delayed Cord " Clamping?: No  Cord Clamped Date/Time: 4/15/2024  5:12 PM  Cord Blood Disposition: Sent with Baby  Gases Sent?: No  Stem Cell Collection (by MD): No       Placenta    Placenta delivery date/time: 4/15/2024 1713  Placenta removal: Spontaneous  Placenta appearance: Intact  Placenta disposition: Discarded           Labor Events:       labor: No     Labor Onset Date/Time: 04/15/2024 08:35     Dilation Complete Date/Time: 04/15/2024 16:00     Start Pushing Date/Time: 04/15/2024 16:00     Rupture Date/Time: 04/15/24  0835         Rupture type: ARM (Artificial Rupture)         Fluid Amount:       Fluid Color: Clear       steroids: None     Antibiotics given for GBS: Yes     Induction: oxytocin;amniotomy     Indications for induction:  Elective     Augmentation:       Indications for augmentation:       Labor complications: None     Additional complications:          Cervical ripening:                     Delivery:      Episiotomy: None     Indication for Episiotomy:       Perineal Lacerations: 2nd Repaired:  Yes   Periurethral Laceration:   Repaired:     Labial Laceration:   Repaired:     Sulcus Laceration:   Repaired:     Vaginal Laceration:   Repaired:     Cervical Laceration:   Repaired:     Repair suture:       Repair # of packets: 1     Last Value - EBL - Nursing (mL):       Sum - EBL - Nursing (mL): 0     Last Value - EBL - Anesthesia (mL):      Calculated QBL (mL):       Running total QBL (mL):       Vaginal Sweep Performed: Yes     Surgicount Correct: Yes       Other providers:       Anesthesia    Method: Epidural          Details (if applicable):  Trial of Labor      Categorization:      Priority:     Indications for :     Incision Type:       Additional  information:  Forceps:    Vacuum:    Breech:    Observed anomalies    Other (Comments):

## 2024-04-16 PROBLEM — O48.0 POST TERM PREGNANCY OVER 40 WEEKS: Status: ACTIVE | Noted: 2024-04-16

## 2024-04-16 LAB
BASOPHILS # BLD AUTO: 0.03 K/UL (ref 0–0.2)
BASOPHILS NFR BLD AUTO: 0.2 % (ref 0–1)
DIFFERENTIAL METHOD BLD: ABNORMAL
EOSINOPHIL # BLD AUTO: 0.09 K/UL (ref 0–0.5)
EOSINOPHIL NFR BLD AUTO: 0.5 % (ref 1–4)
ERYTHROCYTE [DISTWIDTH] IN BLOOD BY AUTOMATED COUNT: 13.9 % (ref 11.5–14.5)
HCT VFR BLD AUTO: 29.4 % (ref 38–47)
HGB BLD-MCNC: 9.3 G/DL (ref 12–16)
IMM GRANULOCYTES # BLD AUTO: 0.1 K/UL (ref 0–0.04)
IMM GRANULOCYTES NFR BLD: 0.6 % (ref 0–0.4)
LYMPHOCYTES # BLD AUTO: 2.9 K/UL (ref 1–4.8)
LYMPHOCYTES NFR BLD AUTO: 16.6 % (ref 27–41)
MCH RBC QN AUTO: 27.9 PG (ref 27–31)
MCHC RBC AUTO-ENTMCNC: 31.6 G/DL (ref 32–36)
MCV RBC AUTO: 88.3 FL (ref 80–96)
MONOCYTES # BLD AUTO: 1.14 K/UL (ref 0–0.8)
MONOCYTES NFR BLD AUTO: 6.5 % (ref 2–6)
MPC BLD CALC-MCNC: 11.4 FL (ref 9.4–12.4)
NEUTROPHILS # BLD AUTO: 13.19 K/UL (ref 1.8–7.7)
NEUTROPHILS NFR BLD AUTO: 75.6 % (ref 53–65)
NRBC # BLD AUTO: 0 X10E3/UL
NRBC, AUTO (.00): 0 %
PLATELET # BLD AUTO: 257 K/UL (ref 150–400)
RBC # BLD AUTO: 3.33 M/UL (ref 4.2–5.4)
WBC # BLD AUTO: 17.45 K/UL (ref 4.5–11)

## 2024-04-16 PROCEDURE — 11000001 HC ACUTE MED/SURG PRIVATE ROOM

## 2024-04-16 PROCEDURE — 25000003 PHARM REV CODE 250: Performed by: NURSE PRACTITIONER

## 2024-04-16 PROCEDURE — 85025 COMPLETE CBC W/AUTO DIFF WBC: CPT | Performed by: NURSE PRACTITIONER

## 2024-04-16 RX ADMIN — HYDROCODONE BITARTRATE AND ACETAMINOPHEN 1 TABLET: 5; 325 TABLET ORAL at 11:04

## 2024-04-16 RX ADMIN — IBUPROFEN 800 MG: 800 TABLET, FILM COATED ORAL at 06:04

## 2024-04-16 RX ADMIN — HYDROCODONE BITARTRATE AND ACETAMINOPHEN 1 TABLET: 5; 325 TABLET ORAL at 04:04

## 2024-04-16 RX ADMIN — HYDROCODONE BITARTRATE AND ACETAMINOPHEN 1 TABLET: 5; 325 TABLET ORAL at 01:04

## 2024-04-16 RX ADMIN — Medication 1 TABLET: at 08:04

## 2024-04-16 RX ADMIN — IBUPROFEN 800 MG: 800 TABLET, FILM COATED ORAL at 08:04

## 2024-04-16 RX ADMIN — HYDROCODONE BITARTRATE AND ACETAMINOPHEN 1 TABLET: 5; 325 TABLET ORAL at 08:04

## 2024-04-16 NOTE — H&P
[facility] -  Labor and Delivery  Obstetrics  History & Physical    Patient Name: Cathleen Howell  MRN: [id number]  Admission Date: 4/15/2024  Primary Care Provider: Cassi Garcia NP    Subjective:     Principal Problem:Post term pregnancy over 40 weeks    History of Present Illness:  Patient is a G1 t 40 5/7 weeks gestation admitted this morning for Pitocin induction. Patient has received care at [facility] [place]. The pregnancy has been uneventful as no problems with HTN or GDM. Discussed risks of IOL with patient and partner and desire expressed to proceed with IOL.    Obstetric HPI:  Patient reports Frequency: Every 2 minutes contractions, active fetal movement, No vaginal bleeding , No loss of fluid     0835am AROM, clear fluid noted. IUPC inserted at the 6 O'clock position.    OB History    Para Term  AB Living   2 1 1 0 1 1   SAB IAB Ectopic Multiple Live Births   0 0 0 0 1      # Outcome Date GA Lbr Lee/2nd Weight Sex Type Anes PTL Lv   2 Term 04/15/24 40w5d 07:25 / 01:11 3.232 kg (7 lb 2 oz) F Vag-Spont EPI N NICOLASA      Name: Girl Cathleen Howell      Apgar1: 8  Apgar5: 9   1 AB              Past Medical History:   Diagnosis Date    Anxiety     Depression      No past surgical history on file.    Current Facility-Administered Medications   Medication Dose Route Frequency Provider Last Rate Last Admin    calcium carbonate 200 mg calcium (500 mg) chewable tablet 500 mg  500 mg Oral TID PRN Naomi Ghosh MD        carboprost injection 250 mcg  250 mcg Intramuscular Q15 Min PRN Nori Bullard FNP        diphenhydrAMINE capsule 25 mg  25 mg Oral Q4H PRN Nori Bullard FNP        diphenhydrAMINE injection 25 mg  25 mg Intravenous Q4H PRN Nori Bullard FNP        diphenoxylate-atropine 2.5-0.025 mg per tablet 2 tablet  2 tablet Oral Q6H PRN Nori Bullard FNP        docusate sodium capsule 200 mg  200 mg Oral BID PRN Nori Bullard FNP         ePHEDrine sulfate 10 mg  10 mg Intravenous Once PRN Ramiro Torres MD        famotidine (PF) injection 20 mg  20 mg Intravenous Once PRN Ramiro Torres MD        HYDROcodone-acetaminophen 5-325 mg per tablet 1 tablet  1 tablet Oral Q4H PRN Nori Bullard B, FNP   1 tablet at 04/16/24 0817    hydrocortisone 2.5 % rectal cream   Rectal TID PRN Nori Bullard, FNP        ibuprofen tablet 800 mg  800 mg Oral Q8H PRN Nori Bullard B, FNP   800 mg at 04/16/24 0816    lactated ringers bolus 1,000 mL  1,000 mL Intravenous PRN Naomi Ghosh  mL/hr at 04/15/24 0901 Rate Change at 04/15/24 0901    lactated ringers bolus 500 mL  500 mL Intravenous PRN Naomi Gohsh MD        lanolin cream   Topical (Top) PRN Nori Bullard B, FNP        LIDOcaine HCL 10 mg/ml (1%) injection 10 mL  10 mL Intradermal Once PRN Naomi Ghosh MD        lidocaine-aloe vera 0.5 % (SOLARCAINE WITH ALOE) topical spray   Topical (Top) QID PRN Nori Bullard B, FNP        measles, mumps and rubella vaccine 1,000-12,500 TCID50/0.5 mL injection 0.5 mL  0.5 mL Subcutaneous vaccine x 1 dose Nori Bullard B, FNP        methylergonovine injection 200 mcg  200 mcg Intramuscular Once PRN Nori Bullard B, FNP        miSOPROStoL tablet 800 mcg  800 mcg Rectal Once PRN Michael Bullardace B, FNP        ondansetron disintegrating tablet 8 mg  8 mg Oral Q8H PRN Nori Bullard B, FNP        ondansetron injection 4 mg  4 mg Intravenous Q6H PRN Ramiro Torres MD        oxytocin 30 units in 500 mL lactated ringers infusion (non-titrating)  95 imelda-units/min Intravenous Once Dante-Nori Pereira B, FNP        oxytocin 30 units in 500 mL lactated ringers infusion (non-titrating)  95 imelda-units/min Intravenous Once PRN Nori Bullard, FNP 95 mL/hr at 04/15/24 1810 95 imelda-units/min at 04/15/24 1810    oxytocin injection 10 Units  10 Units  Intramuscular Once PRN Dante-Michael Pereiraace B, FNP        prenatal vitamin oral tablet  1 tablet Oral Daily Dante-Kelli Nori B, FNP   1 tablet at 04/16/24 0816    rho(d) immune globulin injection 300 mcg  300 mcg Intramuscular vaccine x 1 dose Cordova-Kelli, Nori B, FNP        simethicone chewable tablet 80 mg  1 tablet Oral Q6H PRN Dante-Kelli Nori B, FNP        sodium chloride 0.9% flush 10 mL  10 mL Intravenous BID Dante-Kelli Nori B, FNP        sodium citrate-citric acid 500-334 mg/5 ml solution 30 mL  30 mL Oral Once PRN Ramiro Torres MD        Tdap (BOOSTRIX) vaccine injection 0.5 mL  0.5 mL Intramuscular vaccine x 1 dose Dante-Michael Pereiraace B, FNP        tranexamic acid in NaCl,iso-os IVPB 1,000 mg  1,000 mg Intravenous Q30 Min PRN Dante-Nori Pereira B, FNP           Review of patient's allergies indicates:   Allergen Reactions    Sulfa (sulfonamide antibiotics) Hives     Possible because states runs in family        Family History       Problem Relation (Age of Onset)    Cancer Maternal Grandmother    Depression Mother    Diabetes Mother    Heart disease Mother    Hyperlipidemia Mother    Hypertension Mother          Tobacco Use    Smoking status: Never    Smokeless tobacco: Never   Substance and Sexual Activity    Alcohol use: Never    Drug use: Never    Sexual activity: Not on file     Review of Systems   All other systems reviewed and are negative.     Objective:     Vital Signs (Most Recent):  Temp: 97.5 °F (36.4 °C) (04/16/24 0816)  Pulse: 74 (04/16/24 0701)  Resp: 18 (04/16/24 0817)  BP: (!) 125/57 (04/16/24 0516)  SpO2: 99 % (04/16/24 0701) Vital Signs (24h Range):  Temp:  [97 °F (36.1 °C)-98.1 °F (36.7 °C)] 97.5 °F (36.4 °C)  Pulse:  [] 74  Resp:  [18-20] 18  SpO2:  [92 %-100 %] 99 %  BP: (107-125)/(56-78) 125/57     Weight: 107.1 kg (236 lb 3.2 oz)  Body mass index is 40.54 kg/m².    FHT: 140'sCat 1 (reassuring)  TOCO:  Q 2 minutes     Physical Exam:    Constitutional: She is oriented to person, place, and time. She appears well-developed and well-nourished.    HENT:   Head: Normocephalic.    Eyes: Pupils are equal, round, and reactive to light. EOM are normal.     Cardiovascular:  Normal rate, regular rhythm, normal heart sounds and intact distal pulses.           Pulse Score: 2+   Pulmonary/Chest: Effort normal and breath sounds normal.        Abdominal:   Gravid uterus     Genitourinary:    Vagina and uterus normal.             Musculoskeletal: Normal range of motion and moves all extremeties.       Neurological: She is alert and oriented to person, place, and time. She has normal reflexes.    Skin: Skin is warm and dry.    Psychiatric: She has a normal mood and affect. Her behavior is normal. Judgment and thought content normal.        Cervix:  Dilation:  3  Effacement:  70%  Station: -2  Presentation: Vertex     Significant Labs:  Lab Results   Component Value Date    GROUPTRH O NEG 04/15/2024    HEPBSAG Non-Reactive 04/15/2024       I have personallly reviewed all pertinent lab results from the last 24 hours.  Assessment/Plan:     18 y.o. female  at 40w5d for:    * Post term pregnancy over 40 weeks  Routine IP management  Pain management as desired  Anticipate         Nori Bullard, TIMOTHY  Obstetrics  [facility] -  Labor and Delivery

## 2024-04-16 NOTE — PROGRESS NOTES
Interval History: Patient is PP day 1 status post  of viable female.    She is doing well this morning. She is tolerating a regular diet without nausea or vomiting. She is voiding spontaneously. She is ambulating. She has passed flatus, and has not a BM. Vaginal bleeding is mild. She denies fever or chills. Abdominal pain is mild and controlled with oral medications. She Is breastfeeding. She desires circumcision for her male baby: not applicable.    Objective:     Vital Signs (Most Recent):  Temp: 97.5 °F (36.4 °C) (24 08)  Pulse: 74 (24 0701)  Resp: 18 (24 08)  BP: (!) 125/57 (24 0516)  SpO2: 99 % (24) Vital Signs (24h Range):  Temp:  [97 °F (36.1 °C)-98.1 °F (36.7 °C)] 97.5 °F (36.4 °C)  Pulse:  [] 74  Resp:  [18-20] 18  SpO2:  [92 %-100 %] 99 %  BP: (107-125)/(56-78) 125/57     Weight: 107.1 kg (236 lb 3.2 oz)  Body mass index is 40.54 kg/m².      Intake/Output Summary (Last 24 hours) at 2024 1157  Last data filed at 2024 0140  Gross per 24 hour   Intake --   Output 700 ml   Net -700 ml         Significant Labs:  Lab Results   Component Value Date    GROUPTRH O NEG 04/15/2024    HEPBSAG Non-Reactive 04/15/2024     Recent Labs   Lab 24  0508   HGB 9.3*   HCT 29.4*       I have personallly reviewed all pertinent lab results from the last 24 hours.

## 2024-04-16 NOTE — SUBJECTIVE & OBJECTIVE
Obstetric HPI:  Patient reports Frequency: Every 2 minutes contractions, active fetal movement, No vaginal bleeding , No loss of fluid     0835am AROM, clear fluid noted. IUPC inserted at the 6 O'clock position.    OB History    Para Term  AB Living   2 1 1 0 1 1   SAB IAB Ectopic Multiple Live Births   0 0 0 0 1      # Outcome Date GA Lbr Lee/2nd Weight Sex Type Anes PTL Lv   2 Term 04/15/24 40w5d 07:25 / 01:11 3.232 kg (7 lb 2 oz) F Vag-Spont EPI N NICOLASA      Name: Girl Cathleen Howell      Apgar1: 8  Apgar5: 9   1 AB              Past Medical History:   Diagnosis Date    Anxiety     Depression      No past surgical history on file.    Current Facility-Administered Medications   Medication Dose Route Frequency Provider Last Rate Last Admin    calcium carbonate 200 mg calcium (500 mg) chewable tablet 500 mg  500 mg Oral TID PRN Naomi Ghosh MD        carboprost injection 250 mcg  250 mcg Intramuscular Q15 Min PRN Nori Bullard, TIMOTHY        diphenhydrAMINE capsule 25 mg  25 mg Oral Q4H PRN Nori Bullard, TIMOTHY        diphenhydrAMINE injection 25 mg  25 mg Intravenous Q4H PRN Nori Bullard FNP        diphenoxylate-atropine 2.5-0.025 mg per tablet 2 tablet  2 tablet Oral Q6H PRN Nori Bullard FNP        docusate sodium capsule 200 mg  200 mg Oral BID PRN Nori Bullard, TIMOTHY        ePHEDrine sulfate 10 mg  10 mg Intravenous Once PRN Ramiro Torres MD        famotidine (PF) injection 20 mg  20 mg Intravenous Once PRN Ramiro Torres MD        HYDROcodone-acetaminophen 5-325 mg per tablet 1 tablet  1 tablet Oral Q4H PRN Nori Bullard FNP   1 tablet at 24 0817    hydrocortisone 2.5 % rectal cream   Rectal TID PRN Nori Bullard FNP        ibuprofen tablet 800 mg  800 mg Oral Q8H PRN Nori Bullard, FNP   800 mg at 24 0816    lactated ringers bolus 1,000 mL  1,000 mL Intravenous PRN Davina  MD Naomi 999 mL/hr at 04/15/24 0901 Rate Change at 04/15/24 0901    lactated ringers bolus 500 mL  500 mL Intravenous PRN Naomi Ghosh MD        lanolin cream   Topical (Top) PRN Dante-Michael Pereiraace B, FNP        LIDOcaine HCL 10 mg/ml (1%) injection 10 mL  10 mL Intradermal Once PRN Naomi Ghosh MD        lidocaine-aloe vera 0.5 % (SOLARCAINE WITH ALOE) topical spray   Topical (Top) QID PRN Dante-Kelli, Nori B, FNP        measles, mumps and rubella vaccine 1,000-12,500 TCID50/0.5 mL injection 0.5 mL  0.5 mL Subcutaneous vaccine x 1 dose Dante-Michael Pereiraace B, FNP        methylergonovine injection 200 mcg  200 mcg Intramuscular Once PRN Dante-Kelli, Nori B, FNP        miSOPROStoL tablet 800 mcg  800 mcg Rectal Once PRN Dante-Kelli, Nori B, FNP        ondansetron disintegrating tablet 8 mg  8 mg Oral Q8H PRN Dante-Kelli, Nori B, FNP        ondansetron injection 4 mg  4 mg Intravenous Q6H PRN Ramiro Torres MD        oxytocin 30 units in 500 mL lactated ringers infusion (non-titrating)  95 imelda-units/min Intravenous Once Dante-Kelli, Nori B, FNP        oxytocin 30 units in 500 mL lactated ringers infusion (non-titrating)  95 imelda-units/min Intravenous Once PRN Dante-Kelli, Nori B, FNP 95 mL/hr at 04/15/24 1810 95 imelda-units/min at 04/15/24 1810    oxytocin injection 10 Units  10 Units Intramuscular Once PRN Dante-Kelli, Nori B, FNP        prenatal vitamin oral tablet  1 tablet Oral Daily Dante-Michael Pereiraace B, FNP   1 tablet at 04/16/24 0816    rho(d) immune globulin injection 300 mcg  300 mcg Intramuscular vaccine x 1 dose Dante-Kelli Nori B, FNP        simethicone chewable tablet 80 mg  1 tablet Oral Q6H PRN Dante-Kelli Nori B, FNP        sodium chloride 0.9% flush 10 mL  10 mL Intravenous BID Nori Bullard, FNP        sodium citrate-citric acid 500-334 mg/5 ml solution 30 mL  30 mL Oral Once PRN Ramiro Torres, MD         Tdap (BOOSTRIX) vaccine injection 0.5 mL  0.5 mL Intramuscular vaccine x 1 dose Nori Bullard B, FNP        tranexamic acid in NaCl,iso-os IVPB 1,000 mg  1,000 mg Intravenous Q30 Min PRN Nori Bullard B, FNP           Review of patient's allergies indicates:   Allergen Reactions    Sulfa (sulfonamide antibiotics) Hives     Possible because states runs in family        Family History       Problem Relation (Age of Onset)    Cancer Maternal Grandmother    Depression Mother    Diabetes Mother    Heart disease Mother    Hyperlipidemia Mother    Hypertension Mother          Tobacco Use    Smoking status: Never    Smokeless tobacco: Never   Substance and Sexual Activity    Alcohol use: Never    Drug use: Never    Sexual activity: Not on file     Review of Systems   All other systems reviewed and are negative.     Objective:     Vital Signs (Most Recent):  Temp: 97.5 °F (36.4 °C) (04/16/24 0816)  Pulse: 74 (04/16/24 0701)  Resp: 18 (04/16/24 0817)  BP: (!) 125/57 (04/16/24 0516)  SpO2: 99 % (04/16/24 0701) Vital Signs (24h Range):  Temp:  [97 °F (36.1 °C)-98.1 °F (36.7 °C)] 97.5 °F (36.4 °C)  Pulse:  [] 74  Resp:  [18-20] 18  SpO2:  [92 %-100 %] 99 %  BP: (107-125)/(56-78) 125/57     Weight: 107.1 kg (236 lb 3.2 oz)  Body mass index is 40.54 kg/m².    FHT: 140'sCat 1 (reassuring)  TOCO:  Q 2 minutes     Physical Exam:   Constitutional: She is oriented to person, place, and time. She appears well-developed and well-nourished.    HENT:   Head: Normocephalic.    Eyes: Pupils are equal, round, and reactive to light. EOM are normal.     Cardiovascular:  Normal rate, regular rhythm, normal heart sounds and intact distal pulses.           Pulse Score: 2+   Pulmonary/Chest: Effort normal and breath sounds normal.        Abdominal:   Gravid uterus     Genitourinary:    Vagina and uterus normal.             Musculoskeletal: Normal range of motion and moves all extremeties.       Neurological: She is alert and  oriented to person, place, and time. She has normal reflexes.    Skin: Skin is warm and dry.    Psychiatric: She has a normal mood and affect. Her behavior is normal. Judgment and thought content normal.        Cervix:  Dilation:  3  Effacement:  70%  Station: -2  Presentation: Vertex     Significant Labs:  Lab Results   Component Value Date    GROUPTRH O NEG 04/15/2024    HEPBSAG Non-Reactive 04/15/2024       I have personallly reviewed all pertinent lab results from the last 24 hours.

## 2024-04-16 NOTE — HPI
Patient is a G1 t 40 5/7 weeks gestation admitted this morning for Pitocin induction. Patient has received care at [facility] [place]. The pregnancy has been uneventful as no problems with HTN or GDM. Discussed risks of IOL with patient and partner and desire expressed to proceed with IOL.

## 2024-04-16 NOTE — ANESTHESIA POSTPROCEDURE EVALUATION
Anesthesia Post Evaluation    Patient: Cathleen Howell    Procedure(s) Performed: * No procedures listed *    Final Anesthesia Type: epidural      Patient location during evaluation: labor & delivery  Patient participation: Yes- Able to Participate  Level of consciousness: awake and alert  Post-procedure vital signs: reviewed and stable  Pain management: adequate  Airway patency: patent    PONV status at discharge: No PONV  Anesthetic complications: no      Cardiovascular status: blood pressure returned to baseline  Respiratory status: unassisted  Hydration status: euvolemic  Follow-up not needed.              Vitals Value Taken Time   /58 04/15/24 2217   Temp 36.5 °C (97.7 °F) 04/15/24 1925   Pulse 113 04/15/24 2217   Resp 18 04/15/24 1925   SpO2 99 % 04/15/24 1925   Vitals shown include unfiled device data.      No case tracking events are documented in the log.      Pain/Scotty Score: Pain Rating Prior to Med Admin: 7 (4/15/2024  7:48 PM)  Pain Rating Post Med Admin: 5 (4/15/2024  8:20 PM)

## 2024-04-17 VITALS
SYSTOLIC BLOOD PRESSURE: 114 MMHG | OXYGEN SATURATION: 99 % | WEIGHT: 236.19 LBS | BODY MASS INDEX: 40.32 KG/M2 | RESPIRATION RATE: 18 BRPM | TEMPERATURE: 98 F | HEIGHT: 64 IN | HEART RATE: 99 BPM | DIASTOLIC BLOOD PRESSURE: 66 MMHG

## 2024-04-17 PROCEDURE — 25000003 PHARM REV CODE 250: Performed by: NURSE PRACTITIONER

## 2024-04-17 PROCEDURE — 25000003 PHARM REV CODE 250: Performed by: OBSTETRICS & GYNECOLOGY

## 2024-04-17 RX ORDER — HYDROCODONE BITARTRATE AND ACETAMINOPHEN 5; 325 MG/1; MG/1
1 TABLET ORAL EVERY 4 HOURS PRN
Qty: 20 TABLET | Refills: 0 | Status: SHIPPED | OUTPATIENT
Start: 2024-04-17

## 2024-04-17 RX ORDER — MEDROXYPROGESTERONE ACETATE 150 MG/ML
150 INJECTION, SUSPENSION INTRAMUSCULAR ONCE
Status: COMPLETED | OUTPATIENT
Start: 2024-04-17 | End: 2024-04-17

## 2024-04-17 RX ORDER — HYDROCORTISONE 25 MG/G
CREAM TOPICAL 3 TIMES DAILY PRN
Qty: 28 G | Refills: 0 | Status: SHIPPED | OUTPATIENT
Start: 2024-04-17

## 2024-04-17 RX ORDER — IBUPROFEN 800 MG/1
800 TABLET ORAL EVERY 8 HOURS PRN
Qty: 60 TABLET | Refills: 0 | Status: SHIPPED | OUTPATIENT
Start: 2024-04-17

## 2024-04-17 RX ORDER — DOCUSATE SODIUM 100 MG/1
200 CAPSULE, LIQUID FILLED ORAL 2 TIMES DAILY PRN
Qty: 20 CAPSULE | Refills: 0 | Status: SHIPPED | OUTPATIENT
Start: 2024-04-17

## 2024-04-17 RX ORDER — MEDROXYPROGESTERONE ACETATE 150 MG/ML
150 INJECTION, SUSPENSION INTRAMUSCULAR ONCE
Qty: 1 ML | Refills: 0 | Status: SHIPPED | OUTPATIENT
Start: 2024-04-17 | End: 2024-04-17

## 2024-04-17 RX ADMIN — IBUPROFEN 800 MG: 800 TABLET, FILM COATED ORAL at 12:04

## 2024-04-17 RX ADMIN — HYDROCODONE BITARTRATE AND ACETAMINOPHEN 1 TABLET: 5; 325 TABLET ORAL at 07:04

## 2024-04-17 RX ADMIN — Medication 1 TABLET: at 08:04

## 2024-04-17 RX ADMIN — IBUPROFEN 800 MG: 800 TABLET, FILM COATED ORAL at 05:04

## 2024-04-17 RX ADMIN — MEDROXYPROGESTERONE ACETATE 150 MG: 150 INJECTION, SUSPENSION INTRAMUSCULAR at 12:04

## 2024-04-17 NOTE — PLAN OF CARE
Problem: Adult Inpatient Plan of Care  Goal: Plan of Care Review  2024 by Heaven Hobbs RN  Outcome: Ongoing, Progressing  2024 by Heaven Hobbs RN  Outcome: Ongoing, Progressing  Goal: Patient-Specific Goal (Individualized)  2024 by Heaven Hobbs RN  Outcome: Ongoing, Progressing  2024 by Heaven Hobbs RN  Outcome: Ongoing, Progressing  Goal: Absence of Hospital-Acquired Illness or Injury  2024 by Heaven Hobbs RN  Outcome: Ongoing, Progressing  2024 by Heaven Hobbs RN  Outcome: Ongoing, Progressing  Goal: Optimal Comfort and Wellbeing  2024 by Heaven Hobbs RN  Outcome: Ongoing, Progressing  2024 by Heaven Hobbs RN  Outcome: Ongoing, Progressing  Goal: Readiness for Transition of Care  2024 by Heaven Hobbs RN  Outcome: Ongoing, Progressing  2024 by Heaven Hobbs RN  Outcome: Ongoing, Progressing     Problem: Adult Inpatient Plan of Care  Goal: Patient-Specific Goal (Individualized)  2024 by Heaven Hobbs RN  Outcome: Ongoing, Progressing  2024 by Heaven Hobbs RN  Outcome: Ongoing, Progressing     Problem: Infection  Goal: Absence of Infection Signs and Symptoms  2024 by Heaven Hobbs RN  Outcome: Ongoing, Progressing  2024 by Heaven Hobbs RN  Outcome: Ongoing, Progressing     Problem:  Fall Injury Risk  Goal: Absence of Fall, Infant Drop and Related Injury  2024 by Heaven Hobbs RN  Outcome: Ongoing, Progressing  2024 by Heaven Hobbs RN  Outcome: Ongoing, Progressing     Problem: Bariatric Environmental Safety  Goal: Safety Maintained with Care  2024 by Heaven Hobbs RN  Outcome: Ongoing, Progressing  2024 by Heaven Hobbs RN  Outcome: Ongoing, Progressing     Problem: Adjustment to Role Transition (Postpartum Vaginal Delivery)  Goal: Successful Maternal Role Transition  2024  by Heaven Hobbs RN  Outcome: Ongoing, Progressing  4/16/2024 2019 by Heaven Hobbs RN  Outcome: Ongoing, Progressing     Problem: Bleeding (Postpartum Vaginal Delivery)  Goal: Hemostasis  4/16/2024 2020 by Heaven Hobbs RN  Outcome: Ongoing, Progressing  4/16/2024 2019 by Heaven Hobbs RN  Outcome: Ongoing, Progressing     Problem: Infection (Postpartum Vaginal Delivery)  Goal: Absence of Infection Signs/Symptoms  4/16/2024 2020 by Heaven Hobbs RN  Outcome: Ongoing, Progressing  4/16/2024 2019 by Heaven Hobbs RN  Outcome: Ongoing, Progressing     Problem: Pain (Postpartum Vaginal Delivery)  Goal: Acceptable Pain Control  4/16/2024 2020 by Heaven Hobbs RN  Outcome: Ongoing, Progressing  4/16/2024 2019 by Heaven Hobbs RN  Outcome: Ongoing, Progressing     Problem: Urinary Retention (Postpartum Vaginal Delivery)  Goal: Effective Urinary Elimination  4/16/2024 2020 by Heaven Hobbs RN  Outcome: Ongoing, Progressing  4/16/2024 2019 by Heaven Hobbs RN  Outcome: Ongoing, Progressing     Problem: Breastfeeding  Goal: Effective Breastfeeding  4/16/2024 2020 by Heaven Hobbs RN  Outcome: Ongoing, Progressing  4/16/2024 2019 by Heaven Hobbs RN  Outcome: Ongoing, Progressing

## 2024-04-17 NOTE — PLAN OF CARE
Problem: Adult Inpatient Plan of Care  Goal: Plan of Care Review  Outcome: Met  Goal: Patient-Specific Goal (Individualized)  Outcome: Met  Goal: Absence of Hospital-Acquired Illness or Injury  Outcome: Met  Goal: Optimal Comfort and Wellbeing  Outcome: Met  Goal: Readiness for Transition of Care  Outcome: Met     Problem: Infection  Goal: Absence of Infection Signs and Symptoms  Outcome: Met     Problem:  Fall Injury Risk  Goal: Absence of Fall, Infant Drop and Related Injury  Outcome: Met     Problem: Bariatric Environmental Safety  Goal: Safety Maintained with Care  Outcome: Met     Problem: Adjustment to Role Transition (Postpartum Vaginal Delivery)  Goal: Successful Maternal Role Transition  Outcome: Met     Problem: Bleeding (Postpartum Vaginal Delivery)  Goal: Hemostasis  Outcome: Met     Problem: Infection (Postpartum Vaginal Delivery)  Goal: Absence of Infection Signs/Symptoms  Outcome: Met     Problem: Pain (Postpartum Vaginal Delivery)  Goal: Acceptable Pain Control  Outcome: Met     Problem: Urinary Retention (Postpartum Vaginal Delivery)  Goal: Effective Urinary Elimination  Outcome: Met     Problem: Breastfeeding  Goal: Effective Breastfeeding  Outcome: Met

## 2024-04-17 NOTE — NURSING
"Patient given "Your Guide to Postpartum and  Care" for education during this hospital stay. Booklet provided with information resources. Patient oriented on how to use QR codes and find topics in book, and oriented to flip book in patient room. Patient verbalized that they are able to read and understand material provided to them. Instructed to call nurses for questions. Mom educated on benefits of skin to skin for at least 1 hour after delivery, the benefits of rooming in with infant in helping mom to learn and respond to feeding cues, caring for their infant, and bonding, and the importance on exclusive breastfeeding for the first 6 months.    "

## 2024-04-17 NOTE — HOSPITAL COURSE
Patient is PP day 2 status post  of viable female. Patient reports normal bowel and bladder habits since delivery. Patient reports minimal bleeding. Patient continues to breast feed. Patient expressed desire for depo provera for contraception management. Patient is recovering well. Plan is to discharge patient home this morning and f/u with patient in the clinic in 2 weeks.

## 2024-04-17 NOTE — DISCHARGE INSTRUCTIONS
Topic Nurse Date Time Comments   PP Education Booklet Given MW 4/17/24       Skin to skin MW 4/17/24     Rooming in MW 4/17/24     Importance of Exclusive Breastfeeding for 6 mo MW 4/17/24     Bleeding MW 4/17/24     Incision Care MW 4/17/24     Sex MW 4/17/24     Pain Management MW 4/17/24     Perineal Care MW 4/17/24     Minimizing Engorgement MW 4/17/24     Collection & Storage of BM MW 4/17/24     S/S of BF Problems MW 4/17/24     Hand Expression MW 4/17/24     Maternal s/s breast problems MW 4/17/24     Mgnt of Common BF Problems (engorgement, sore & cracked nipples) MW 4/17/24     PPD/Anxiety MW 4/17/24

## 2024-04-17 NOTE — PLAN OF CARE
Problem: Adult Inpatient Plan of Care  Goal: Plan of Care Review  Outcome: Ongoing, Progressing  Goal: Patient-Specific Goal (Individualized)  Outcome: Ongoing, Progressing  Goal: Absence of Hospital-Acquired Illness or Injury  Outcome: Ongoing, Progressing  Goal: Optimal Comfort and Wellbeing  Outcome: Ongoing, Progressing  Goal: Readiness for Transition of Care  Outcome: Ongoing, Progressing     Problem: Adult Inpatient Plan of Care  Goal: Plan of Care Review  Outcome: Ongoing, Progressing  Goal: Patient-Specific Goal (Individualized)  Outcome: Ongoing, Progressing  Goal: Absence of Hospital-Acquired Illness or Injury  Outcome: Ongoing, Progressing  Goal: Optimal Comfort and Wellbeing  Outcome: Ongoing, Progressing  Goal: Readiness for Transition of Care  Outcome: Ongoing, Progressing     Problem: Infection  Goal: Absence of Infection Signs and Symptoms  Outcome: Ongoing, Progressing     Problem:  Fall Injury Risk  Goal: Absence of Fall, Infant Drop and Related Injury  Outcome: Ongoing, Progressing     Problem: Bariatric Environmental Safety  Goal: Safety Maintained with Care  Outcome: Ongoing, Progressing     Problem: Adjustment to Role Transition (Postpartum Vaginal Delivery)  Goal: Successful Maternal Role Transition  Outcome: Ongoing, Progressing     Problem: Bleeding (Postpartum Vaginal Delivery)  Goal: Hemostasis  Outcome: Ongoing, Progressing     Problem: Infection (Postpartum Vaginal Delivery)  Goal: Absence of Infection Signs/Symptoms  Outcome: Ongoing, Progressing     Problem: Pain (Postpartum Vaginal Delivery)  Goal: Acceptable Pain Control  Outcome: Ongoing, Progressing     Problem: Urinary Retention (Postpartum Vaginal Delivery)  Goal: Effective Urinary Elimination  Outcome: Ongoing, Progressing     Problem: Breastfeeding  Goal: Effective Breastfeeding  Outcome: Ongoing, Progressing

## 2024-04-17 NOTE — LACTATION NOTE
This note was copied from a baby's chart.  Breastfeeding rounds done, mom reports infant not latching well, does better with nipple shield, mom encouraged to use nipple shield until she and infant feel more confident in latching, mom also pumping and bottle feeding EBM, mom denies questions or concerns, mom to call with any needs

## 2024-04-17 NOTE — DISCHARGE SUMMARY
[facility] -  Labor and Delivery  Obstetrics  Discharge Summary      Patient Name: Cathleen Howell  MRN: [id number]  Admission Date: 4/15/2024  Hospital Length of Stay: 2 days  Discharge Date and Time:  2024 8:56 AM  Attending Physician: Naomi Ghosh MD   Discharging Provider: TIMOTHY Ross   Primary Care Provider: Cassi Garcia NP    HPI: Patient is a G1 t 40 5/7 weeks gestation admitted this morning for Pitocin induction. [place] [facility] The pregnancy has been uneventful as no problems with HTN or GDM. Discussed risks of IOL with patient and partner and desire expressed to proceed with IOL.        * No surgery found *     Hospital Course:   Patient is PP day 2 status post  of viable female. Patient reports normal bowel and bladder habits since delivery. Patient reports minimal bleeding. Patient continues to breast feed. Patient expressed desire for depo provera for contraception management. Patient is recovering well. Plan is to discharge patient home this morning and f/u with patient in the clinic in 2 weeks.     Consults (From admission, onward)          Status Ordering Provider     Inpatient consult to Anesthesiology  Once        Provider:  (Not yet assigned)    Acknowledged NAOMI GHOSH            Final Active Diagnoses:    Diagnosis Date Noted POA    PRINCIPAL PROBLEM:  Post term pregnancy over 40 weeks [O48.0] 2024 Unknown      Problems Resolved During this Admission:        Significant Diagnostic Studies: Labs: All labs within the past 24 hours have been reviewed      Feeding Method: breast    Immunizations       Date Immunization Status Dose Route/Site Given by    04/15/24 1733 Rho (D) Immune Globulin - IM Incomplete 300 mcg Intramuscular/     04/15/24 1833 MMR Incomplete 0.5 mL Subcutaneous/     04/15/24 1833 Tdap Incomplete 0.5 mL Intramuscular/             Delivery:    Episiotomy: None   Lacerations: 2nd   Repair suture:    "  Repair # of packets: 1   Blood loss (ml):       Birth information:  YOB: 2026   Time of birth: 5:11 PM   Sex: female   Delivery type: Vaginal, Spontaneous   Gestational Age: 40w5d     Measurements    Weight: 3232 g  Weight (lbs): 7 lb 2 oz  Length: 53.3 cm  Length (in): 21"         Delivery Clinician: Delivery Providers    Delivering clinician: Nori Bullard FNP          Additional  information:  Forceps:    Vacuum:    Breech:    Observed anomalies      Living?:     Apgars    Living status: Living  Apgar Component Scores:  1 min.:  5 min.:  10 min.:  15 min.:  20 min.:    Skin color:  0  1       Heart rate:  2  2       Reflex irritability:  2  2       Muscle tone:  2  2       Respiratory effort:  2  2       Total:  8  9       Apgars assigned by: MATTHEW RAMIREZ RN         Placenta: Delivered:       appearance  Pending Diagnostic Studies:       None            Discharged Condition: good    Disposition: Home or Self Care    Follow Up:   Follow-up Information       Nori Bullard FNP Follow up on 2024.    Specialty: Internal Medicine  Contact information:  [address] [place] [id number]  [phone]                           Patient Instructions:   No discharge procedures on file.  Medications:  Current Discharge Medication List        START taking these medications    Details   docusate sodium (COLACE) 100 MG capsule Take 2 capsules (200 mg total) by mouth 2 (two) times daily as needed for Constipation.  Qty: 20 capsule, Refills: 0      HYDROcodone-acetaminophen (NORCO) 5-325 mg per tablet Take 1 tablet by mouth every 4 (four) hours as needed for Pain.  Qty: 20 tablet, Refills: 0    Comments: n/a       hydrocortisone (ANUSOL-HC) 2.5 % rectal cream Place rectally 3 (three) times daily as needed.  Qty: 28 g, Refills: 0      ibuprofen (ADVIL,MOTRIN) 800 MG tablet Take 1 tablet (800 mg total) by mouth every 8 (eight) hours as needed for Pain or " Temperature greater than.  Qty: 60 tablet, Refills: 0      lanolin Crea cream Apply topically as needed for Dry Skin (to nipples).  Qty: 7 g, Refills: 0      medroxyPROGESTERone (DEPO-PROVERA) 150 mg/mL Syrg Inject 1 mL (150 mg total) into the muscle once. for 1 dose  Qty: 1 mL, Refills: 0           CONTINUE these medications which have NOT CHANGED    Details   buPROPion (WELLBUTRIN) 75 MG tablet Take 37.5 mg by mouth every morning.      norgestimate-ethinyl estradioL (ORTHO TRI-CYCLEN,TRI-SPRINTEC) 0.18/0.215/0.25 mg-35 mcg (28) tablet       ondansetron (ZOFRAN) 4 MG tablet Take 1 tablet (4 mg total) by mouth every 6 (six) hours as needed for Nausea.  Qty: 12 tablet, Refills: 0    Associated Diagnoses: Viral gastroenteritis             Nori Bullard, TIMOTHY  Obstetrics  [facility] -  Labor and Delivery